# Patient Record
Sex: MALE | Race: WHITE
[De-identification: names, ages, dates, MRNs, and addresses within clinical notes are randomized per-mention and may not be internally consistent; named-entity substitution may affect disease eponyms.]

---

## 2021-01-14 ENCOUNTER — HOSPITAL ENCOUNTER (INPATIENT)
Dept: HOSPITAL 26 - MED | Age: 71
LOS: 5 days | Discharge: HOME HEALTH | DRG: 682 | End: 2021-01-19
Attending: FAMILY MEDICINE | Admitting: FAMILY MEDICINE
Payer: MEDICARE

## 2021-01-14 VITALS — SYSTOLIC BLOOD PRESSURE: 98 MMHG | DIASTOLIC BLOOD PRESSURE: 78 MMHG

## 2021-01-14 VITALS — WEIGHT: 170 LBS | HEIGHT: 64 IN | BODY MASS INDEX: 29.02 KG/M2

## 2021-01-14 DIAGNOSIS — Z79.899: ICD-10-CM

## 2021-01-14 DIAGNOSIS — N17.0: Primary | ICD-10-CM

## 2021-01-14 DIAGNOSIS — Z20.822: ICD-10-CM

## 2021-01-14 DIAGNOSIS — J45.909: ICD-10-CM

## 2021-01-14 DIAGNOSIS — Z90.49: ICD-10-CM

## 2021-01-14 DIAGNOSIS — G93.41: ICD-10-CM

## 2021-01-14 DIAGNOSIS — R74.01: ICD-10-CM

## 2021-01-14 DIAGNOSIS — E11.22: ICD-10-CM

## 2021-01-14 DIAGNOSIS — I95.9: ICD-10-CM

## 2021-01-14 DIAGNOSIS — R00.1: ICD-10-CM

## 2021-01-14 DIAGNOSIS — Z79.84: ICD-10-CM

## 2021-01-14 DIAGNOSIS — I13.10: ICD-10-CM

## 2021-01-14 DIAGNOSIS — E78.2: ICD-10-CM

## 2021-01-14 DIAGNOSIS — Z85.038: ICD-10-CM

## 2021-01-14 DIAGNOSIS — N18.31: ICD-10-CM

## 2021-01-14 DIAGNOSIS — E87.5: ICD-10-CM

## 2021-01-14 DIAGNOSIS — Z92.21: ICD-10-CM

## 2021-01-14 LAB
ALBUMIN FLD-MCNC: 3.7 G/DL (ref 3.4–5)
ALBUMIN FLD-MCNC: 3.7 G/DL (ref 3.4–5)
AMYLASE SERPL-CCNC: 139 U/L (ref 25–115)
ANION GAP SERPL CALCULATED.3IONS-SCNC: 15 MMOL/L (ref 8–16)
ANION GAP SERPL CALCULATED.3IONS-SCNC: 19.6 MMOL/L (ref 8–16)
APPEARANCE UR: CLEAR
AST SERPL-CCNC: 80 U/L (ref 15–37)
AST SERPL-CCNC: 81 U/L (ref 15–37)
BASOPHILS # BLD AUTO: 0.1 K/UL (ref 0–0.22)
BASOPHILS NFR BLD AUTO: 0.6 % (ref 0–2)
BILIRUB SERPL-MCNC: 0.6 MG/DL (ref 0–1)
BILIRUB SERPL-MCNC: 0.6 MG/DL (ref 0–1)
BILIRUB UR QL STRIP: NEGATIVE
BUN SERPL-MCNC: 39 MG/DL (ref 7–18)
BUN SERPL-MCNC: 41 MG/DL (ref 7–18)
CHLORIDE SERPL-SCNC: 98 MMOL/L (ref 98–107)
CHLORIDE SERPL-SCNC: 99 MMOL/L (ref 98–107)
CHOLEST/HDLC SERPL: 2.9 {RATIO} (ref 1–4.5)
CO2 SERPL-SCNC: 19 MMOL/L (ref 21–32)
CO2 SERPL-SCNC: 22.6 MMOL/L (ref 21–32)
COLOR UR: YELLOW
CREAT SERPL-MCNC: 2.5 MG/DL (ref 0.6–1.3)
CREAT SERPL-MCNC: 2.6 MG/DL (ref 0.6–1.3)
EOSINOPHIL # BLD AUTO: 0.1 K/UL (ref 0–0.4)
EOSINOPHIL NFR BLD AUTO: 0.6 % (ref 0–4)
ERYTHROCYTE [DISTWIDTH] IN BLOOD BY AUTOMATED COUNT: 15.7 % (ref 11.6–13.7)
GFR SERPL CREATININE-BSD FRML MDRD: (no result) ML/MIN (ref 90–?)
GFR SERPL CREATININE-BSD FRML MDRD: (no result) ML/MIN (ref 90–?)
GLUCOSE SERPL-MCNC: 402 MG/DL (ref 74–106)
GLUCOSE SERPL-MCNC: 405 MG/DL (ref 74–106)
GLUCOSE UR STRIP-MCNC: (no result) MG/DL
HCT VFR BLD AUTO: 33.1 % (ref 36–52)
HDLC SERPL-MCNC: 53 MG/DL (ref 40–60)
HGB BLD-MCNC: 10.1 G/DL (ref 12–18)
HGB UR QL STRIP: NEGATIVE
LDLC SERPL CALC-MCNC: 62 MG/DL (ref 60–100)
LEUKOCYTE ESTERASE UR QL STRIP: NEGATIVE
LIPASE SERPL-CCNC: 459 U/L (ref 73–393)
LYMPHOCYTES # BLD AUTO: 2.2 K/UL (ref 2–11.5)
LYMPHOCYTES NFR BLD AUTO: 23.4 % (ref 20.5–51.1)
MAGNESIUM SERPL-MCNC: 2.3 MG/DL (ref 1.8–2.4)
MCH RBC QN AUTO: 22 PG (ref 27–31)
MCHC RBC AUTO-ENTMCNC: 30 G/DL (ref 33–37)
MCV RBC AUTO: 72.4 FL (ref 80–94)
MONOCYTES # BLD AUTO: 0.4 K/UL (ref 0.8–1)
MONOCYTES NFR BLD AUTO: 4.5 % (ref 1.7–9.3)
NEUTROPHILS # BLD AUTO: 6.6 K/UL (ref 1.8–7.7)
NEUTROPHILS NFR BLD AUTO: 70.9 % (ref 42.2–75.2)
NITRITE UR QL STRIP: NEGATIVE
PH UR STRIP: 5.5 [PH] (ref 5–9)
PHOSPHATE SERPL-MCNC: 4.5 MG/DL (ref 2.5–4.9)
PLATELET # BLD AUTO: 186 K/UL (ref 140–450)
POTASSIUM SERPL-SCNC: 7.6 MMOL/L (ref 3.5–5.1)
POTASSIUM SERPL-SCNC: 7.6 MMOL/L (ref 3.5–5.1)
PROTHROMBIN TIME: 10.6 SECS (ref 10.8–13.4)
RBC # BLD AUTO: 4.57 MIL/UL (ref 4.2–6.1)
SODIUM SERPL-SCNC: 129 MMOL/L (ref 136–145)
SODIUM SERPL-SCNC: 129 MMOL/L (ref 136–145)
T4 FREE SERPL-MCNC: 1.41 NG/DL (ref 0.76–1.46)
TRIGL SERPL-MCNC: 195 MG/DL (ref 30–150)
TSH SERPL DL<=0.05 MIU/L-ACNC: 2.15 UIU/ML (ref 0.34–3.74)
WBC # BLD AUTO: 9.4 K/UL (ref 4.8–10.8)

## 2021-01-14 RX ADMIN — SODIUM CHLORIDE SCH MLS/HR: 9 INJECTION, SOLUTION INTRAVENOUS at 18:36

## 2021-01-14 RX ADMIN — INSULIN LISPRO PRN UNITS: 100 INJECTION, SOLUTION INTRAVENOUS; SUBCUTANEOUS at 20:26

## 2021-01-14 RX ADMIN — Medication SCH DEV: at 20:27

## 2021-01-14 RX ADMIN — DOPAMINE HYDROCHLORIDE PRN MLS/HR: 160 INJECTION, SOLUTION INTRAVENOUS at 23:30

## 2021-01-14 RX ADMIN — DOPAMINE HYDROCHLORIDE PRN MLS/HR: 160 INJECTION, SOLUTION INTRAVENOUS at 18:45

## 2021-01-14 NOTE — NUR
PT ASLEEP IN BED. EQUAL CHEST RISE AND FALL, CARDIAC MONITOR/PULSE OX IN PLACE. 
BED LOCKED AND IN LOWEST POSITION. SIDE RAILS X2.

## 2021-01-14 NOTE — NUR
71 YO M BIBA FROM HOME FOR C/C OF DIZZINESS X 1 DAY. PT PRESENTS BRADYCARDIC IN 
THE 20'S-30'S HR. CAP REFILL >3. NO EDEMA NOTED. S1S2 HEARD. LUNG SOUNDS CLEAR 
THROUGHOUT. PT DENIES C/P, STATES HE WAS FEELING NAUSEA WITH NO VOMITING. 
DENIES CARDIAC PROBLEMS. PT PLACED ON CARDIAC MONITOR/PULSE OX. BED LOCKED AND 
IN LOWEST POSITION. SIDE RAILS X2. 



MED HX: COLON CANCER 3 YEARS AGO, DM2, GERD

NKA 

-------------------------------------------------------------------------------

Addendum: 01/14/21 at 1745 by [email or web address]

-------------------------------------------------------------------------------

71 YO M BIBA FROM HOME FOR C/C OF DIZZINESS X 1 DAY. PT PRESENTS BRADYCARDIC IN 
THE 20'S-30'S HR, A&O X4. CAP REFILL >3. NO EDEMA NOTED. S1S2 HEARD. LUNG 
SOUNDS CLEAR THROUGHOUT. PT DENIES C/P, STATES HE WAS FEELING NAUSEA WITH NO 
VOMITING. DENIES CARDIAC PROBLEMS. PT PLACED ON CARDIAC MONITOR/PULSE OX. BED 
LOCKED AND IN LOWEST POSITION. SIDE RAILS X2. 



MED HX: COLON CANCER 3 YEARS AGO, DM2, GERD

NKA

## 2021-01-15 VITALS — SYSTOLIC BLOOD PRESSURE: 123 MMHG | DIASTOLIC BLOOD PRESSURE: 80 MMHG

## 2021-01-15 VITALS — DIASTOLIC BLOOD PRESSURE: 69 MMHG | SYSTOLIC BLOOD PRESSURE: 142 MMHG

## 2021-01-15 VITALS — SYSTOLIC BLOOD PRESSURE: 144 MMHG | DIASTOLIC BLOOD PRESSURE: 55 MMHG

## 2021-01-15 VITALS — SYSTOLIC BLOOD PRESSURE: 145 MMHG | DIASTOLIC BLOOD PRESSURE: 74 MMHG

## 2021-01-15 VITALS — SYSTOLIC BLOOD PRESSURE: 143 MMHG | DIASTOLIC BLOOD PRESSURE: 73 MMHG

## 2021-01-15 VITALS — SYSTOLIC BLOOD PRESSURE: 155 MMHG | DIASTOLIC BLOOD PRESSURE: 70 MMHG

## 2021-01-15 VITALS — DIASTOLIC BLOOD PRESSURE: 69 MMHG | SYSTOLIC BLOOD PRESSURE: 155 MMHG

## 2021-01-15 VITALS — DIASTOLIC BLOOD PRESSURE: 75 MMHG | SYSTOLIC BLOOD PRESSURE: 151 MMHG

## 2021-01-15 LAB
ANION GAP SERPL CALCULATED.3IONS-SCNC: 16.1 MMOL/L (ref 8–16)
BARBITURATES UR QL SCN: NEGATIVE NG/ML
BASOPHILS # BLD AUTO: 0 K/UL (ref 0–0.22)
BASOPHILS NFR BLD AUTO: 0.2 % (ref 0–2)
BENZODIAZ UR QL SCN: NEGATIVE NG/ML
BUN SERPL-MCNC: 38 MG/DL (ref 7–18)
BZE UR QL SCN: NEGATIVE NG/ML
CANNABINOIDS UR QL SCN: NEGATIVE NG/ML
CHLORIDE SERPL-SCNC: 104 MMOL/L (ref 98–107)
CO2 SERPL-SCNC: 22.6 MMOL/L (ref 21–32)
CREAT SERPL-MCNC: 2.2 MG/DL (ref 0.6–1.3)
EOSINOPHIL # BLD AUTO: 0 K/UL (ref 0–0.4)
EOSINOPHIL NFR BLD AUTO: 0 % (ref 0–4)
ERYTHROCYTE [DISTWIDTH] IN BLOOD BY AUTOMATED COUNT: 15.5 % (ref 11.6–13.7)
GFR SERPL CREATININE-BSD FRML MDRD: (no result) ML/MIN (ref 90–?)
GLUCOSE SERPL-MCNC: 277 MG/DL (ref 74–106)
HCT VFR BLD AUTO: 38.2 % (ref 36–52)
HGB BLD-MCNC: 11.8 G/DL (ref 12–18)
LYMPHOCYTES # BLD AUTO: 1.5 K/UL (ref 2–11.5)
LYMPHOCYTES NFR BLD AUTO: 10.3 % (ref 20.5–51.1)
MCH RBC QN AUTO: 22 PG (ref 27–31)
MCHC RBC AUTO-ENTMCNC: 31 G/DL (ref 33–37)
MCV RBC AUTO: 71.7 FL (ref 80–94)
MONOCYTES # BLD AUTO: 1.2 K/UL (ref 0.8–1)
MONOCYTES NFR BLD AUTO: 8.1 % (ref 1.7–9.3)
NEUTROPHILS # BLD AUTO: 11.6 K/UL (ref 1.8–7.7)
NEUTROPHILS NFR BLD AUTO: 81.4 % (ref 42.2–75.2)
OPIATES UR QL SCN: NEGATIVE NG/ML
PCP UR QL SCN: NEGATIVE NG/ML
PLATELET # BLD AUTO: 257 K/UL (ref 140–450)
POTASSIUM SERPL-SCNC: 5.7 MMOL/L (ref 3.5–5.1)
RBC # BLD AUTO: 5.33 MIL/UL (ref 4.2–6.1)
SODIUM SERPL-SCNC: 137 MMOL/L (ref 136–145)
WBC # BLD AUTO: 14.2 K/UL (ref 4.8–10.8)

## 2021-01-15 RX ADMIN — Medication SCH DEV: at 07:36

## 2021-01-15 RX ADMIN — INSULIN LISPRO PRN UNITS: 100 INJECTION, SOLUTION INTRAVENOUS; SUBCUTANEOUS at 11:49

## 2021-01-15 RX ADMIN — ACETAMINOPHEN PRN MG: 325 TABLET ORAL at 10:08

## 2021-01-15 RX ADMIN — Medication SCH DEV: at 21:05

## 2021-01-15 RX ADMIN — DOPAMINE HYDROCHLORIDE PRN MLS/HR: 160 INJECTION, SOLUTION INTRAVENOUS at 03:01

## 2021-01-15 RX ADMIN — SODIUM CHLORIDE SCH MLS/HR: 9 INJECTION, SOLUTION INTRAVENOUS at 11:25

## 2021-01-15 RX ADMIN — PANTOPRAZOLE SODIUM SCH MG: 40 TABLET, DELAYED RELEASE ORAL at 09:25

## 2021-01-15 RX ADMIN — GLIPIZIDE SCH MG: 5 TABLET, FILM COATED, EXTENDED RELEASE ORAL at 09:25

## 2021-01-15 RX ADMIN — MONTELUKAST SCH MG: 10 TABLET, FILM COATED ORAL at 09:26

## 2021-01-15 RX ADMIN — DOPAMINE HYDROCHLORIDE PRN MLS/HR: 160 INJECTION, SOLUTION INTRAVENOUS at 06:05

## 2021-01-15 RX ADMIN — Medication SCH DEV: at 11:47

## 2021-01-15 RX ADMIN — ACETAMINOPHEN PRN MG: 325 TABLET ORAL at 21:06

## 2021-01-15 RX ADMIN — INSULIN LISPRO PRN UNITS: 100 INJECTION, SOLUTION INTRAVENOUS; SUBCUTANEOUS at 07:40

## 2021-01-15 RX ADMIN — Medication SCH DEV: at 16:43

## 2021-01-15 NOTE — NUR
Patient will be admitted to care of DR. CHIU. Admited to ICU.  Will go to room 
5. Belongings list completed.  Report to EMY FRASER.

## 2021-01-15 NOTE — NUR
RECEIVED [facility] FROM [facility]. PATIENT ALERT AND ORIENTED X4, PERSON, PLACE, 
TIME AND EVENT. PATIENT ON NASAL CANULA AT 3, TOLERATING WELL. PATIENT LYING IN BED AT HOB 
30 DEGREES, COMFORTABLY AND CALMLY. IV ACCESS SITES INCLUDE RIGHT EJ SALINE LOCK AND 
PORTICATH RIGHT CHEST WALL, DAYSHIFT NURSE SAID TO NOT ACCESS PORTICATH AT THIS TIME. MALE 
URINAL AT BEDSIDE, ABLE TO USE INDEPENDENTLY WITH OCCASIONAL ASSISTANCE. PATIENT ON CARDIAC 
DIET, SKINS INTACT. PATIENT IN POSITION OF COMFORT, ABLE TO TURN IN BED WITH ASSISTANCE. 
PATIENT OFFLOADED FROM PRESSURE. CONTINUOUS TELE MONITORING, FREQUENT ROUNDING AND CLOSE 
MONITORING IN PLACE.

-------------------------------------------------------------------------------

Addendum: 01/16/21 at 0631 by Parviz Alonso RN RN

-------------------------------------------------------------------------------

@ [phone]

## 2021-01-15 NOTE — NUR
PT CHANGED INTO CLEAN GOWN, NEW LINENS PROVIDED, CARDIAC MONITOR/PULSE OX IN 
PLACE. BED LOCKED AND IN LOWEST POSITION. SIDE RAILS X1. PT STATES HE HAS A HA 
AND WOULD LIKE SOMETHING FOR PAIN. PRN TYLENOL PROVIDED.

## 2021-01-15 NOTE — NUR
ON NC 5L, 88% O2 SATURATION. PT PLACED ON NON REBREATHER 15L. O2 SATURATION NOW 
AT 98%. WILL CONTINUE TO MONITOR.

## 2021-01-15 NOTE — NUR
FREQUENT ROUNDING AND MONITORING, PATIENT TOLERATING NASAL CANNULA @ 3L WELL, LYING DOWN IN 
THE BED CALM, CONTENT AND TRYING TO REST. WILL CONTINUE TO MONITOR.

## 2021-01-15 NOTE — NUR
PT ASLEEP IN BED , EQUAL CHEST RISE AND FALL, BED LOCKED AND IN LOWEST 
POSITION. SIDE RAILS X1. CARDIAC MONITOR/PULSE OX IN PLACE. CALL LIGHT IN 
REACH.

## 2021-01-15 NOTE — NUR
SOCIAL WORK NOTE:



Patient's Orientation 

Unable To Assess

Information Provided By 

SCAR KESSLER - WIFE

Comments 

SW WAS UNABLE TO MEET PATIENT AT BEDSIDE TO COMPLETE ASSESSMENT. SW 

CONTACTED WIFE TO COMPLETE ASSESSMENT.

, Realtionship and Phone Number 

SCAR CARDENAS

[phone]

Healthcare Power of  

No

Does Patient Have a POLST 

No

Identifying Problems 

No Social Work Triggers

Is A Social Work Consult Needed 

No

Mandate Report Filed 

No

Explanation Of Identifying Problems 

PATIENT IS A 70-YEAR OLD MALE ADMITTED FOR BRADYCARDIA AND HYPERKALEMIA. 

PATIENT HAS PMHX OF DM, HTN, ASTHMA, AND COLON CANCER.

Admitted From 

Home

Pre-Admission Level Of Functioning Status 

Independent/Ambulatory

Prior Resources/Services Used In Last 12 Months 

No Prior Resources Used

Prior DME 

No Prior DME Used

Dialysis Comments 

N/A

Living Situation 

House

Lives With Spouse

Patient Had Caregiver 

No

Home Support 

No Caregiver Issues

Financial Issues 

No Known Financial Issue

Referral To The Financial Counselor Needed 

No

Factors/Needs 

No D/C Needs Identified

Pt/Rep Participated In Discharge Plan 

Yes

Patient/Family Agress With Discharge Plan 

Yes

Discharge Plan Comments 

TENTATIVE DISCHARGE PLAN IS FOR PATIENT TO RETURN HOME.

DC Plan Status 

Initiated

## 2021-01-15 NOTE — NUR
PT HAS EYES CLOSED, RESTING IN BED RESPIRATIONS EVEN AND UNLABORED. CHEST RISE 
IS SYMMETRICALL. WILL CONTINUE TO MONITOR

## 2021-01-16 VITALS — SYSTOLIC BLOOD PRESSURE: 91 MMHG | DIASTOLIC BLOOD PRESSURE: 48 MMHG

## 2021-01-16 VITALS — DIASTOLIC BLOOD PRESSURE: 71 MMHG | SYSTOLIC BLOOD PRESSURE: 114 MMHG

## 2021-01-16 VITALS — DIASTOLIC BLOOD PRESSURE: 75 MMHG | SYSTOLIC BLOOD PRESSURE: 163 MMHG

## 2021-01-16 VITALS — SYSTOLIC BLOOD PRESSURE: 99 MMHG | DIASTOLIC BLOOD PRESSURE: 54 MMHG

## 2021-01-16 VITALS — DIASTOLIC BLOOD PRESSURE: 67 MMHG | SYSTOLIC BLOOD PRESSURE: 147 MMHG

## 2021-01-16 VITALS — DIASTOLIC BLOOD PRESSURE: 69 MMHG | SYSTOLIC BLOOD PRESSURE: 153 MMHG

## 2021-01-16 VITALS — SYSTOLIC BLOOD PRESSURE: 157 MMHG | DIASTOLIC BLOOD PRESSURE: 70 MMHG

## 2021-01-16 VITALS — DIASTOLIC BLOOD PRESSURE: 85 MMHG | SYSTOLIC BLOOD PRESSURE: 162 MMHG

## 2021-01-16 VITALS — DIASTOLIC BLOOD PRESSURE: 77 MMHG | SYSTOLIC BLOOD PRESSURE: 156 MMHG

## 2021-01-16 VITALS — SYSTOLIC BLOOD PRESSURE: 158 MMHG | DIASTOLIC BLOOD PRESSURE: 75 MMHG

## 2021-01-16 VITALS — DIASTOLIC BLOOD PRESSURE: 90 MMHG | SYSTOLIC BLOOD PRESSURE: 163 MMHG

## 2021-01-16 VITALS — DIASTOLIC BLOOD PRESSURE: 95 MMHG | SYSTOLIC BLOOD PRESSURE: 173 MMHG

## 2021-01-16 VITALS — DIASTOLIC BLOOD PRESSURE: 69 MMHG | SYSTOLIC BLOOD PRESSURE: 168 MMHG

## 2021-01-16 VITALS — DIASTOLIC BLOOD PRESSURE: 51 MMHG | SYSTOLIC BLOOD PRESSURE: 109 MMHG

## 2021-01-16 VITALS — SYSTOLIC BLOOD PRESSURE: 99 MMHG | DIASTOLIC BLOOD PRESSURE: 57 MMHG

## 2021-01-16 VITALS — DIASTOLIC BLOOD PRESSURE: 73 MMHG | SYSTOLIC BLOOD PRESSURE: 152 MMHG

## 2021-01-16 VITALS — DIASTOLIC BLOOD PRESSURE: 60 MMHG | SYSTOLIC BLOOD PRESSURE: 130 MMHG

## 2021-01-16 VITALS — DIASTOLIC BLOOD PRESSURE: 72 MMHG | SYSTOLIC BLOOD PRESSURE: 153 MMHG

## 2021-01-16 VITALS — DIASTOLIC BLOOD PRESSURE: 89 MMHG | SYSTOLIC BLOOD PRESSURE: 160 MMHG

## 2021-01-16 VITALS — DIASTOLIC BLOOD PRESSURE: 91 MMHG | SYSTOLIC BLOOD PRESSURE: 154 MMHG

## 2021-01-16 VITALS — DIASTOLIC BLOOD PRESSURE: 56 MMHG | SYSTOLIC BLOOD PRESSURE: 122 MMHG

## 2021-01-16 VITALS — SYSTOLIC BLOOD PRESSURE: 157 MMHG | DIASTOLIC BLOOD PRESSURE: 78 MMHG

## 2021-01-16 VITALS — DIASTOLIC BLOOD PRESSURE: 75 MMHG | SYSTOLIC BLOOD PRESSURE: 152 MMHG

## 2021-01-16 VITALS — DIASTOLIC BLOOD PRESSURE: 62 MMHG | SYSTOLIC BLOOD PRESSURE: 131 MMHG

## 2021-01-16 VITALS — DIASTOLIC BLOOD PRESSURE: 55 MMHG | SYSTOLIC BLOOD PRESSURE: 98 MMHG

## 2021-01-16 VITALS — SYSTOLIC BLOOD PRESSURE: 92 MMHG | DIASTOLIC BLOOD PRESSURE: 62 MMHG

## 2021-01-16 LAB
ANION GAP SERPL CALCULATED.3IONS-SCNC: 18 MMOL/L (ref 8–16)
BUN SERPL-MCNC: 24 MG/DL (ref 7–18)
CHLORIDE SERPL-SCNC: 105 MMOL/L (ref 98–107)
CO2 SERPL-SCNC: 19.1 MMOL/L (ref 21–32)
CREAT SERPL-MCNC: 1.2 MG/DL (ref 0.6–1.3)
GFR SERPL CREATININE-BSD FRML MDRD: (no result) ML/MIN (ref 90–?)
GLUCOSE SERPL-MCNC: 236 MG/DL (ref 74–106)
POTASSIUM SERPL-SCNC: 4.1 MMOL/L (ref 3.5–5.1)
SODIUM SERPL-SCNC: 138 MMOL/L (ref 136–145)
T3RU NFR SERPL: 29 % (ref 24–39)
T4 SERPL-MCNC: 5.3 UG/DL (ref 4.5–12)

## 2021-01-16 RX ADMIN — Medication SCH DEV: at 07:30

## 2021-01-16 RX ADMIN — Medication SCH DEV: at 11:30

## 2021-01-16 RX ADMIN — Medication SCH DEV: at 21:00

## 2021-01-16 RX ADMIN — SODIUM CHLORIDE SCH MLS/HR: 9 INJECTION, SOLUTION INTRAVENOUS at 03:37

## 2021-01-16 RX ADMIN — Medication SCH DEV: at 16:30

## 2021-01-16 RX ADMIN — MONTELUKAST SCH MG: 10 TABLET, FILM COATED ORAL at 09:30

## 2021-01-16 RX ADMIN — PANTOPRAZOLE SODIUM SCH MG: 40 TABLET, DELAYED RELEASE ORAL at 09:29

## 2021-01-16 RX ADMIN — DOPAMINE HYDROCHLORIDE PRN MLS/HR: 160 INJECTION, SOLUTION INTRAVENOUS at 09:51

## 2021-01-16 RX ADMIN — INSULIN LISPRO PRN UNITS: 100 INJECTION, SOLUTION INTRAVENOUS; SUBCUTANEOUS at 08:49

## 2021-01-16 RX ADMIN — DOPAMINE HYDROCHLORIDE PRN MLS/HR: 160 INJECTION, SOLUTION INTRAVENOUS at 00:58

## 2021-01-16 RX ADMIN — GLIPIZIDE SCH MG: 5 TABLET, FILM COATED, EXTENDED RELEASE ORAL at 09:29

## 2021-01-16 RX ADMIN — ACETAMINOPHEN PRN MG: 325 TABLET ORAL at 09:55

## 2021-01-16 NOTE — NUR
PATIENT CONTINUING TO REST COMFORTABLY AND CALMLY IN THE BED, CONTINUING TO ROUND AND 
MONITOR, PATIENT CONTINUING TO TOLERATE NASAL CANULA 3L WELL.

## 2021-01-16 NOTE — NUR
PATIENT SLEEPING, NO COMPLAINTS OF PAIN OR DISCOMFORT, RESTING COMFORTABLY. FREQUENT 
ROUNDING AND MONITORING PROVIDED.

## 2021-01-16 NOTE — NUR
PATIENT HAS BEEN SCREENED AND CATEGORIZED AS HIGH NUTRITION RISK. PATIENT WILL BE SEEN 
WITHIN 1-2 DAYS OF ADMISSION.



01/16/21-01/17/21



JAY DOYLE MS, RDN

## 2021-01-16 NOTE — NUR
HYGIENE, BED BATH, SHAMPOO CAP PROVIDED. LINENS CHANGED, PATIENT ASSISTED, CONTINUE TO 
MONITOR AND FREQUENTLY ROUND. CONTINUOUS TELE MONITORING, NORMAL SINUS.

## 2021-01-16 NOTE — NUR
(01/16/21) RD INITIAL ASSESSMENT COMPLETED

PLEASE REFER TO NUTRITION ASSESSMENT UNDER CARE ACTIVITY FOR ESTIMATED NUTRITIONAL NEEDS. 



RD RECOMMENDATIONS:

1. CONTINUE CARDIAC DIET AS TOLERATED. 

2. RECOMMEND ADDING CCHO 60 GM DIET RESTRICTION TO CURRENT DIET ORDER. 

3. RD WILL F/U 3-5 DAYS; MODERATE RISK. 



JAY DOYLE MS, RDN

## 2021-01-17 VITALS — DIASTOLIC BLOOD PRESSURE: 59 MMHG | SYSTOLIC BLOOD PRESSURE: 121 MMHG

## 2021-01-17 VITALS — SYSTOLIC BLOOD PRESSURE: 100 MMHG | DIASTOLIC BLOOD PRESSURE: 59 MMHG

## 2021-01-17 VITALS — DIASTOLIC BLOOD PRESSURE: 48 MMHG | SYSTOLIC BLOOD PRESSURE: 116 MMHG

## 2021-01-17 VITALS — DIASTOLIC BLOOD PRESSURE: 62 MMHG | SYSTOLIC BLOOD PRESSURE: 129 MMHG

## 2021-01-17 VITALS — DIASTOLIC BLOOD PRESSURE: 46 MMHG | SYSTOLIC BLOOD PRESSURE: 111 MMHG

## 2021-01-17 VITALS — SYSTOLIC BLOOD PRESSURE: 101 MMHG | DIASTOLIC BLOOD PRESSURE: 45 MMHG

## 2021-01-17 VITALS — DIASTOLIC BLOOD PRESSURE: 64 MMHG | SYSTOLIC BLOOD PRESSURE: 115 MMHG

## 2021-01-17 VITALS — DIASTOLIC BLOOD PRESSURE: 58 MMHG | SYSTOLIC BLOOD PRESSURE: 107 MMHG

## 2021-01-17 VITALS — SYSTOLIC BLOOD PRESSURE: 106 MMHG | DIASTOLIC BLOOD PRESSURE: 56 MMHG

## 2021-01-17 VITALS — DIASTOLIC BLOOD PRESSURE: 46 MMHG | SYSTOLIC BLOOD PRESSURE: 115 MMHG

## 2021-01-17 VITALS — DIASTOLIC BLOOD PRESSURE: 55 MMHG | SYSTOLIC BLOOD PRESSURE: 113 MMHG

## 2021-01-17 VITALS — DIASTOLIC BLOOD PRESSURE: 42 MMHG | SYSTOLIC BLOOD PRESSURE: 89 MMHG

## 2021-01-17 LAB
ANION GAP SERPL CALCULATED.3IONS-SCNC: 12.8 MMOL/L (ref 8–16)
BASOPHILS # BLD AUTO: 0.1 K/UL (ref 0–0.22)
BASOPHILS NFR BLD AUTO: 0.7 % (ref 0–2)
BUN SERPL-MCNC: 28 MG/DL (ref 7–18)
CHLORIDE SERPL-SCNC: 105 MMOL/L (ref 98–107)
CO2 SERPL-SCNC: 22.8 MMOL/L (ref 21–32)
CREAT SERPL-MCNC: 1.7 MG/DL (ref 0.6–1.3)
EOSINOPHIL # BLD AUTO: 0 K/UL (ref 0–0.4)
EOSINOPHIL NFR BLD AUTO: 0.3 % (ref 0–4)
ERYTHROCYTE [DISTWIDTH] IN BLOOD BY AUTOMATED COUNT: 14.9 % (ref 11.6–13.7)
GFR SERPL CREATININE-BSD FRML MDRD: (no result) ML/MIN (ref 90–?)
GLUCOSE SERPL-MCNC: 134 MG/DL (ref 74–106)
HCT VFR BLD AUTO: 28.8 % (ref 36–52)
HGB BLD-MCNC: 9.2 G/DL (ref 12–18)
LYMPHOCYTES # BLD AUTO: 1.2 K/UL (ref 2–11.5)
LYMPHOCYTES NFR BLD AUTO: 15 % (ref 20.5–51.1)
MCH RBC QN AUTO: 22 PG (ref 27–31)
MCHC RBC AUTO-ENTMCNC: 32 G/DL (ref 33–37)
MCV RBC AUTO: 70.2 FL (ref 80–94)
MONOCYTES # BLD AUTO: 0.7 K/UL (ref 0.8–1)
MONOCYTES NFR BLD AUTO: 8 % (ref 1.7–9.3)
NEUTROPHILS # BLD AUTO: 6.3 K/UL (ref 1.8–7.7)
NEUTROPHILS NFR BLD AUTO: 76 % (ref 42.2–75.2)
PLATELET # BLD AUTO: 160 K/UL (ref 140–450)
POTASSIUM SERPL-SCNC: 3.6 MMOL/L (ref 3.5–5.1)
RBC # BLD AUTO: 4.1 MIL/UL (ref 4.2–6.1)
SODIUM SERPL-SCNC: 137 MMOL/L (ref 136–145)
WBC # BLD AUTO: 8.2 K/UL (ref 4.8–10.8)

## 2021-01-17 RX ADMIN — Medication SCH DEV: at 20:38

## 2021-01-17 RX ADMIN — GLIPIZIDE SCH MG: 5 TABLET, FILM COATED, EXTENDED RELEASE ORAL at 08:25

## 2021-01-17 RX ADMIN — ZOLPIDEM TARTRATE PRN MG: 5 TABLET ORAL at 21:19

## 2021-01-17 RX ADMIN — Medication SCH DEV: at 16:24

## 2021-01-17 RX ADMIN — MONTELUKAST SCH MG: 10 TABLET, FILM COATED ORAL at 08:25

## 2021-01-17 RX ADMIN — Medication SCH DEV: at 07:30

## 2021-01-17 RX ADMIN — Medication SCH DEV: at 12:11

## 2021-01-17 RX ADMIN — PANTOPRAZOLE SODIUM SCH MG: 40 TABLET, DELAYED RELEASE ORAL at 08:25

## 2021-01-17 NOTE — NUR
VS STABLE. BLOOD SUGAR 133, NO INSULIN COVERAGE NEEDED. PT DENIES ANY PAIN OR DISCOMFORT. NO 
REQUESTS MADE. SAFETY MEASURES IN PLACE. CALL LIGHT WITHIN REACH. WILL CONTINUE TO MONITOR.+

## 2021-01-17 NOTE — NUR
PT VERBALIZED HAVING DIFFICULTY FALLING ASLEEP. PRN AMBIEN GIVEN. WARM FACE TOWEL PROVIDED 
AS REQUESTED. PT KEPT COMFORTABLE. CALL LIGHT WITHIN REACH. WILL CONTINUE TO MONITOR.

## 2021-01-17 NOTE — NUR
RECEIVED PATIENT FROM ICU FOR CONTINUITY OF CARE. PATIENT ARRIVED IN WHEELCHAIR, AWAKE, 
ALERT, AND ORIENTED. RESP EVEN AND UNLABORED ON ROOM AIR. DENIED OF PAIN AT THIS TIME. NO 
NOTED ACUTE S/S DISTRESS. REJ 18G NOTED INTACT AND PATENT, SL. RIGHT UPPER PORTACATH NOTED 
SUBCUTANEOUS. SKIN WARM TO TOUCH AND INTACT. PATIENT ABLE TO FOLLOW COMMAND AND MAKE NEEDS 
KNOWN. CALL LIGHT WITHIN REACH. WILL CONTINUE TO MONITOR.

## 2021-01-17 NOTE — NUR
RECEIVED REPORT FROM NIGHT SHIFT. AOX4, ON ROOM AIR. NO C/O PAIN, NO SOB, NO APPARENT 
DISTRESS. WITH C/O GEN WEAKNESS. REJ 18G INFUSING NS AT 60CC/HR. SKIN WARM AND DRY, INTACT. 
CONTINENT B/B. CALL LIGHT WITHIN REACH. WILL CONTINUE TO MONITOR.

## 2021-01-17 NOTE — NUR
RECEIVED REPORT FROM CHARGE NURSE. PT IN BED RESTING, AAOX4, ABLE TO MAKE NEEDS KNOWN. 
RESPIRATIONS EVEN AND UNLABORED TO ROOM AIR. ABDOMEN IS SOFT AND NON-TENDER, ACTIVE BOWEL 
SOUNDS NOTED. SKIN IS WARM, DRY, AND INTACT. PT WITH IV ACCESS ON RIGHT EJ G18 PATENT AND 
INTACT, SALINE LOCKED. PT ALSO WITH RIGHT UPPER CHEST PORT-A-CATH IN PLACE. PT DENIES ANY 
PAIN OR DISCOMFORT AT THIS TIME. NO REQUESTS MADE. SAFETY MEASURES IN PLACE. CALL LIGHT 
WITHIN REACH. WILL CONTINUE TO MONITOR.

## 2021-01-17 NOTE — NUR
VS STABLE. PT IN BED RESTING. NO S/SX OF DISTRESS NOTED. PT DENIES ANY PAIN OR DISCOMFORT. 
NO REQUESTS MADE. WILL CONTINUE TO MONITOR.

## 2021-01-18 VITALS — SYSTOLIC BLOOD PRESSURE: 141 MMHG | DIASTOLIC BLOOD PRESSURE: 57 MMHG

## 2021-01-18 VITALS — SYSTOLIC BLOOD PRESSURE: 115 MMHG | DIASTOLIC BLOOD PRESSURE: 55 MMHG

## 2021-01-18 VITALS — SYSTOLIC BLOOD PRESSURE: 121 MMHG | DIASTOLIC BLOOD PRESSURE: 52 MMHG

## 2021-01-18 VITALS — DIASTOLIC BLOOD PRESSURE: 55 MMHG | SYSTOLIC BLOOD PRESSURE: 138 MMHG

## 2021-01-18 VITALS — DIASTOLIC BLOOD PRESSURE: 63 MMHG | SYSTOLIC BLOOD PRESSURE: 119 MMHG

## 2021-01-18 VITALS — SYSTOLIC BLOOD PRESSURE: 143 MMHG | DIASTOLIC BLOOD PRESSURE: 59 MMHG

## 2021-01-18 RX ADMIN — INSULIN LISPRO PRN UNITS: 100 INJECTION, SOLUTION INTRAVENOUS; SUBCUTANEOUS at 22:33

## 2021-01-18 RX ADMIN — SODIUM CHLORIDE SCH MLS/HR: 9 INJECTION, SOLUTION INTRAVENOUS at 16:49

## 2021-01-18 RX ADMIN — MONTELUKAST SCH MG: 10 TABLET, FILM COATED ORAL at 08:50

## 2021-01-18 RX ADMIN — ACETAMINOPHEN PRN MG: 325 TABLET ORAL at 18:45

## 2021-01-18 RX ADMIN — Medication SCH DEV: at 16:59

## 2021-01-18 RX ADMIN — PANTOPRAZOLE SODIUM SCH MG: 40 TABLET, DELAYED RELEASE ORAL at 08:51

## 2021-01-18 RX ADMIN — Medication SCH DEV: at 11:30

## 2021-01-18 RX ADMIN — GLIPIZIDE SCH MG: 5 TABLET, FILM COATED, EXTENDED RELEASE ORAL at 08:50

## 2021-01-18 RX ADMIN — GUAIFENESIN AND DEXTROMETHORPHAN PRN ML: 100; 10 SYRUP ORAL at 12:55

## 2021-01-18 RX ADMIN — ZOLPIDEM TARTRATE PRN MG: 5 TABLET ORAL at 22:07

## 2021-01-18 RX ADMIN — Medication SCH DEV: at 06:47

## 2021-01-18 RX ADMIN — Medication SCH DEV: at 21:00

## 2021-01-18 NOTE — NUR
PT ASLEEP. PT NOT IN DISTRESS. VISIBLE CHEST RISE AND FALL NOTED. NO S/SX OF PAIN OR 
DISCOMFORT. SAFETY MEASURES IN PLACE. CALL LIGHT WITHIN REACH. WILL CONTINUE TO MONITOR.

## 2021-01-18 NOTE — NUR
RECEIVED PT FROM NIGHT SHIFT NURSE, PT IS RESTING IN BED,ON ROOM AIR, IV NOTED TO LAC 20G 
SALINE LOCK, OV NOTED TO R. EJ 18G, R. UPPER CHEST PORTACATH NOTED, TELE MONITOR ON, SAFETY 
AND FALL PRECAUTIONS IN PLACE, WILL CONTINUE TO MONITOR.

## 2021-01-18 NOTE — NUR
NORMAL SALINE RUNNING AT 75 ML/HR, PT STATED HE HAS SORE THROAT, LOW TEMP NOTED, 100.1, MD 
NOTIFIED, NO INSULIN NEEDED , WILL CONTINUE TO MONITOR.

## 2021-01-18 NOTE — NUR
VS STABLE. PT NOT IN DISTRESS. DENIES ANY PAIN OR DISCOMFORT. NO REQUESTS MADE AT THIS TIME. 
SAFETY MEASURES IN PLACE. CALL LIGHT WITHIN REACH. WILL CONTINUE TO MONITOR.

## 2021-01-19 VITALS — DIASTOLIC BLOOD PRESSURE: 63 MMHG | SYSTOLIC BLOOD PRESSURE: 130 MMHG

## 2021-01-19 VITALS — DIASTOLIC BLOOD PRESSURE: 52 MMHG | SYSTOLIC BLOOD PRESSURE: 142 MMHG

## 2021-01-19 VITALS — DIASTOLIC BLOOD PRESSURE: 53 MMHG | SYSTOLIC BLOOD PRESSURE: 140 MMHG

## 2021-01-19 VITALS — SYSTOLIC BLOOD PRESSURE: 129 MMHG | DIASTOLIC BLOOD PRESSURE: 62 MMHG

## 2021-01-19 VITALS — SYSTOLIC BLOOD PRESSURE: 102 MMHG | DIASTOLIC BLOOD PRESSURE: 57 MMHG

## 2021-01-19 LAB
ANION GAP SERPL CALCULATED.3IONS-SCNC: 15.4 MMOL/L (ref 8–16)
BASOPHILS # BLD AUTO: 0 K/UL (ref 0–0.22)
BASOPHILS NFR BLD AUTO: 0.4 % (ref 0–2)
BUN SERPL-MCNC: 22 MG/DL (ref 7–18)
CHLORIDE SERPL-SCNC: 102 MMOL/L (ref 98–107)
CO2 SERPL-SCNC: 21.2 MMOL/L (ref 21–32)
CREAT SERPL-MCNC: 1.6 MG/DL (ref 0.6–1.3)
EOSINOPHIL # BLD AUTO: 0 K/UL (ref 0–0.4)
EOSINOPHIL NFR BLD AUTO: 0.9 % (ref 0–4)
ERYTHROCYTE [DISTWIDTH] IN BLOOD BY AUTOMATED COUNT: 15 % (ref 11.6–13.7)
GFR SERPL CREATININE-BSD FRML MDRD: (no result) ML/MIN (ref 90–?)
GLUCOSE SERPL-MCNC: 135 MG/DL (ref 74–106)
HCT VFR BLD AUTO: 28.3 % (ref 36–52)
HGB BLD-MCNC: 8.9 G/DL (ref 12–18)
LYMPHOCYTES # BLD AUTO: 0.7 K/UL (ref 2–11.5)
LYMPHOCYTES NFR BLD AUTO: 12.4 % (ref 20.5–51.1)
MAGNESIUM SERPL-MCNC: 2 MG/DL (ref 1.8–2.4)
MCH RBC QN AUTO: 22 PG (ref 27–31)
MCHC RBC AUTO-ENTMCNC: 31 G/DL (ref 33–37)
MCV RBC AUTO: 70.4 FL (ref 80–94)
MONOCYTES # BLD AUTO: 0.8 K/UL (ref 0.8–1)
MONOCYTES NFR BLD AUTO: 15.2 % (ref 1.7–9.3)
NEUTROPHILS # BLD AUTO: 3.9 K/UL (ref 1.8–7.7)
NEUTROPHILS NFR BLD AUTO: 71.1 % (ref 42.2–75.2)
PHOSPHATE SERPL-MCNC: 2.9 MG/DL (ref 2.5–4.9)
PLATELET # BLD AUTO: 169 K/UL (ref 140–450)
POTASSIUM SERPL-SCNC: 3.6 MMOL/L (ref 3.5–5.1)
RBC # BLD AUTO: 4.01 MIL/UL (ref 4.2–6.1)
SODIUM SERPL-SCNC: 135 MMOL/L (ref 136–145)
WBC # BLD AUTO: 5.5 K/UL (ref 4.8–10.8)

## 2021-01-19 RX ADMIN — PANTOPRAZOLE SODIUM SCH MG: 40 TABLET, DELAYED RELEASE ORAL at 08:27

## 2021-01-19 RX ADMIN — SODIUM CHLORIDE SCH MLS/HR: 9 INJECTION, SOLUTION INTRAVENOUS at 08:28

## 2021-01-19 RX ADMIN — GUAIFENESIN AND DEXTROMETHORPHAN PRN ML: 100; 10 SYRUP ORAL at 17:40

## 2021-01-19 RX ADMIN — GLIPIZIDE SCH MG: 5 TABLET, FILM COATED, EXTENDED RELEASE ORAL at 08:26

## 2021-01-19 RX ADMIN — Medication SCH DEV: at 16:58

## 2021-01-19 RX ADMIN — Medication SCH DEV: at 11:30

## 2021-01-19 RX ADMIN — MONTELUKAST SCH MG: 10 TABLET, FILM COATED ORAL at 08:27

## 2021-01-19 RX ADMIN — Medication SCH DEV: at 06:35

## 2021-01-19 RX ADMIN — GUAIFENESIN AND DEXTROMETHORPHAN PRN ML: 100; 10 SYRUP ORAL at 02:30

## 2021-01-19 RX ADMIN — SODIUM CHLORIDE SCH MLS/HR: 9 INJECTION, SOLUTION INTRAVENOUS at 02:32

## 2021-01-19 RX ADMIN — ACETAMINOPHEN PRN MG: 325 TABLET ORAL at 12:56

## 2021-01-19 NOTE — NUR
TEMP 100F, COOLING MEASURES GIVEN. FLUIDS PROVIDED. DENIED OF ANY PAIN AT THIS TIME. WILL 
CONTINUE TO MONITOR.

## 2021-01-19 NOTE — NUR
TEMP 99. FLUIDS GIVEN. PATIENT IN BED AWAKE AND ALERT. TYLENOL GIVEN D/T C/O HEADACHE. RESP 
EVEN AND UNLABORED. BLOOD SUGAR 110. NO INSULIN COVERAGE AT THIS TIME. CALL LIGHT WITHIN 
REACH. WILL CONTINUE TO MONITOR.

## 2021-01-19 NOTE — NUR
PATIENT DISCHARGED HOME TO FAMILY. PATIENT LEFT WITH ALL PERSONAL BELONGINGS. DISCHARGE 
INSTRUCTIONS PROVIDED, VERBALIZED UNDERSTANDING. PATIENT LEFT IN STABLE CONDITION.

## 2021-01-19 NOTE — NUR
JOHN SCHRADER:

FAXED ORDER FOR HOME HEALTH TO [facility]. 

-------------------------------------------------------------------------------

Addendum: 01/20/21 at 1537 by Manjula Beal CM

-------------------------------------------------------------------------------

JOHN SCHRADER:

SPOKE TO ML AT [facility] THEY RECEIVED ORDER AND ARE WORKING ON IT. THEY WILL CONTACT 
PATIENT ONCE THEY HAVE AN ACCEPTING HOME HEALTH

## 2021-01-19 NOTE — NUR
PATIENT IN BED SLEEPING, CHEST NOTED RISING. NO ACUTE S/S DISTRESS. CALL LIGHT WITHIN REACH. 
WILL CONTINUE TO MONITOR.

## 2021-01-19 NOTE — NUR
RECEIVED PATIENT FROM NIGHT NURSE. PATIENT IN BED AWAKE AND ALERT. RESP EVEN AND UNLABORED 
ON ROOM AIR. DENIED OF PAIN AT THIS TIME. PLAN OF CARE DISCUSSED, PATIENT VERBALIZED 
UNDERSTANDING. RAC 20G INFUSING NS 75 ML/HR, REJ 18G NOTED AND PORTACATH TO RIGHT UPPER 
CHEST. SAFETY  MEASURES IN PLACE. CALL LIGHT WITHIN REACH. WILL CONTINUE TO MONITOR.

## 2021-01-19 NOTE — NUR
PATIENT IN BED AWAKE, ALERT, AND ORIENTED X4. RESP EVEN AND UNLABORED ON ROOM AIR. DENIED OF 
PAIN AT THIS TIME. MORNING ROUTINE MEDICATIONS GIVEN. PATIENT TOLERATED WELL. LAC 20G 
INFUSING NS 75ML/HR. REJ 18G INTACT AND PATENT. PORTACATH NOTED TO RIGHT UPPER CHEST. 
PATIENT ABLE TO MAKE NEEDS KNOWN. SAFETY MEASURES IN PLACE. CALL LIGHT WITHIN REACH. WILL 
CONTINUE TO MONITOR.

## 2021-01-27 ENCOUNTER — HOSPITAL ENCOUNTER (EMERGENCY)
Dept: HOSPITAL 26 - MED | Age: 71
Discharge: ANOTHER HOSPITAL | End: 2021-01-27
Payer: MEDICARE

## 2021-01-27 VITALS — HEIGHT: 64 IN | BODY MASS INDEX: 28.68 KG/M2 | WEIGHT: 168 LBS

## 2021-01-27 VITALS — DIASTOLIC BLOOD PRESSURE: 56 MMHG | SYSTOLIC BLOOD PRESSURE: 123 MMHG

## 2021-01-27 VITALS — DIASTOLIC BLOOD PRESSURE: 58 MMHG | SYSTOLIC BLOOD PRESSURE: 137 MMHG

## 2021-01-27 DIAGNOSIS — I10: ICD-10-CM

## 2021-01-27 DIAGNOSIS — Z20.828: ICD-10-CM

## 2021-01-27 DIAGNOSIS — Z79.84: ICD-10-CM

## 2021-01-27 DIAGNOSIS — Z85.038: ICD-10-CM

## 2021-01-27 DIAGNOSIS — E11.9: ICD-10-CM

## 2021-01-27 DIAGNOSIS — Z79.899: ICD-10-CM

## 2021-01-27 DIAGNOSIS — J18.9: Primary | ICD-10-CM

## 2021-01-27 DIAGNOSIS — E87.1: ICD-10-CM

## 2021-01-27 DIAGNOSIS — J45.909: ICD-10-CM

## 2021-01-27 LAB
ALBUMIN FLD-MCNC: 2.4 G/DL (ref 3.4–5)
ANION GAP SERPL CALCULATED.3IONS-SCNC: 15.1 MMOL/L (ref 8–16)
AST SERPL-CCNC: 87 U/L (ref 15–37)
BASOPHILS # BLD AUTO: 0 K/UL (ref 0–0.22)
BASOPHILS NFR BLD AUTO: 0.4 % (ref 0–2)
BILIRUB SERPL-MCNC: 0.5 MG/DL (ref 0–1)
BUN SERPL-MCNC: 23 MG/DL (ref 7–18)
CHLORIDE SERPL-SCNC: 94 MMOL/L (ref 98–107)
CO2 SERPL-SCNC: 22.4 MMOL/L (ref 21–32)
CREAT SERPL-MCNC: 1.7 MG/DL (ref 0.6–1.3)
EOSINOPHIL # BLD AUTO: 0 K/UL (ref 0–0.4)
EOSINOPHIL NFR BLD AUTO: 0.1 % (ref 0–4)
ERYTHROCYTE [DISTWIDTH] IN BLOOD BY AUTOMATED COUNT: 14.6 % (ref 11.6–13.7)
GFR SERPL CREATININE-BSD FRML MDRD: (no result) ML/MIN (ref 90–?)
GLUCOSE SERPL-MCNC: 185 MG/DL (ref 74–106)
HCT VFR BLD AUTO: 34.5 % (ref 36–52)
HGB BLD-MCNC: 10.9 G/DL (ref 12–18)
LYMPHOCYTES # BLD AUTO: 0.6 K/UL (ref 2–11.5)
LYMPHOCYTES NFR BLD AUTO: 10.9 % (ref 20.5–51.1)
MCH RBC QN AUTO: 22 PG (ref 27–31)
MCHC RBC AUTO-ENTMCNC: 32 G/DL (ref 33–37)
MCV RBC AUTO: 68.9 FL (ref 80–94)
MONOCYTES # BLD AUTO: 0.4 K/UL (ref 0.8–1)
MONOCYTES NFR BLD AUTO: 7.4 % (ref 1.7–9.3)
NEUTROPHILS # BLD AUTO: 4.5 K/UL (ref 1.8–7.7)
NEUTROPHILS NFR BLD AUTO: 81.2 % (ref 42.2–75.2)
PLATELET # BLD AUTO: 356 K/UL (ref 140–450)
POTASSIUM SERPL-SCNC: 3.5 MMOL/L (ref 3.5–5.1)
RBC # BLD AUTO: 5.01 MIL/UL (ref 4.2–6.1)
SODIUM SERPL-SCNC: 128 MMOL/L (ref 136–145)
WBC # BLD AUTO: 5.5 K/UL (ref 4.8–10.8)

## 2021-01-27 PROCEDURE — 80053 COMPREHEN METABOLIC PANEL: CPT

## 2021-01-27 PROCEDURE — 87040 BLOOD CULTURE FOR BACTERIA: CPT

## 2021-01-27 PROCEDURE — 96375 TX/PRO/DX INJ NEW DRUG ADDON: CPT

## 2021-01-27 PROCEDURE — 71045 X-RAY EXAM CHEST 1 VIEW: CPT

## 2021-01-27 PROCEDURE — 85025 COMPLETE CBC W/AUTO DIFF WBC: CPT

## 2021-01-27 PROCEDURE — 99285 EMERGENCY DEPT VISIT HI MDM: CPT

## 2021-01-27 PROCEDURE — 96361 HYDRATE IV INFUSION ADD-ON: CPT

## 2021-01-27 PROCEDURE — 93005 ELECTROCARDIOGRAM TRACING: CPT

## 2021-01-27 PROCEDURE — 83880 ASSAY OF NATRIURETIC PEPTIDE: CPT

## 2021-01-27 PROCEDURE — 36415 COLL VENOUS BLD VENIPUNCTURE: CPT

## 2021-01-27 PROCEDURE — 84484 ASSAY OF TROPONIN QUANT: CPT

## 2021-01-27 PROCEDURE — 96365 THER/PROPH/DIAG IV INF INIT: CPT

## 2021-01-27 PROCEDURE — 87426 SARSCOV CORONAVIRUS AG IA: CPT

## 2021-01-27 RX ADMIN — SODIUM CHLORIDE ONE MLS/HR: 9 INJECTION, SOLUTION INTRAVENOUS at 13:58

## 2021-01-27 RX ADMIN — KETOROLAC TROMETHAMINE ONE MG: 30 INJECTION, SOLUTION INTRAMUSCULAR; INTRAVENOUS at 12:40
